# Patient Record
Sex: MALE | Employment: OTHER | URBAN - METROPOLITAN AREA
[De-identification: names, ages, dates, MRNs, and addresses within clinical notes are randomized per-mention and may not be internally consistent; named-entity substitution may affect disease eponyms.]

---

## 2017-02-14 DIAGNOSIS — E03.9 HYPOTHYROIDISM, UNSPECIFIED TYPE: ICD-10-CM

## 2017-02-14 NOTE — TELEPHONE ENCOUNTER
Patient calling to say that his insurance has changed and he now has [facility] as his mail order pharmacy. Says he need a refill for his levothyroxine 175 mcg tablet 90 day supply. Says their # is [phone] and his Id # is [id number]. Says if you have any questions to call him [phone].

## 2017-02-15 RX ORDER — LEVOTHYROXINE SODIUM 175 UG/1
TABLET ORAL
Qty: 90 TAB | Refills: 0 | Status: SHIPPED | OUTPATIENT
Start: 2017-02-15 | End: 2017-05-05 | Stop reason: SDUPTHER

## 2017-02-16 DIAGNOSIS — N52.9 ERECTILE DYSFUNCTION, UNSPECIFIED ERECTILE DYSFUNCTION TYPE: ICD-10-CM

## 2017-02-16 NOTE — TELEPHONE ENCOUNTER
From: Shyam Spann Sr. To: Dominga Rodriguez MD  Sent: 2/16/2017 9:29 AM EST  Subject: Medication Renewal Request    Original authorizing provider: MD Nakul Chairez. would like a refill of the following medications:  sildenafil (REVATIO) 20 mg tablet Dominga Rodriguez MD]    Preferred pharmacy: [address] [place] [address]    Miya Malik, I'm sorry I should have added yesterday. This one should go to [facility].  Thanks, [employer]

## 2017-02-20 RX ORDER — SILDENAFIL CITRATE 20 MG/1
20-100 TABLET ORAL
Qty: 30 TAB | Refills: 0 | Status: SHIPPED | OUTPATIENT
Start: 2017-02-20

## 2017-05-05 DIAGNOSIS — E03.9 HYPOTHYROIDISM, UNSPECIFIED TYPE: ICD-10-CM

## 2017-05-08 RX ORDER — LEVOTHYROXINE SODIUM 175 UG/1
TABLET ORAL
Qty: 90 TAB | Refills: 0 | Status: SHIPPED | OUTPATIENT
Start: 2017-05-08 | End: 2017-06-16 | Stop reason: SDUPTHER

## 2017-05-17 ENCOUNTER — OFFICE VISIT (OUTPATIENT)
Dept: FAMILY MEDICINE CLINIC | Age: 62
End: 2017-05-17

## 2017-05-17 VITALS
OXYGEN SATURATION: 97 % | BODY MASS INDEX: 25.03 KG/M2 | RESPIRATION RATE: 16 BRPM | WEIGHT: 174.8 LBS | TEMPERATURE: 95.8 F | DIASTOLIC BLOOD PRESSURE: 88 MMHG | SYSTOLIC BLOOD PRESSURE: 128 MMHG | HEIGHT: 70 IN | HEART RATE: 52 BPM

## 2017-05-17 DIAGNOSIS — E03.9 HYPOTHYROIDISM, UNSPECIFIED TYPE: ICD-10-CM

## 2017-05-17 DIAGNOSIS — Z00.00 LABORATORY EXAM ORDERED AS PART OF ROUTINE GENERAL MEDICAL EXAMINATION: ICD-10-CM

## 2017-05-17 DIAGNOSIS — Z00.00 PHYSICAL EXAM: Primary | ICD-10-CM

## 2017-05-17 DIAGNOSIS — R73.09 INCREASED GLUCOSE LEVEL: ICD-10-CM

## 2017-05-17 DIAGNOSIS — N52.9 ERECTILE DYSFUNCTION, UNSPECIFIED ERECTILE DYSFUNCTION TYPE: ICD-10-CM

## 2017-05-17 DIAGNOSIS — E78.00 PURE HYPERCHOLESTEROLEMIA: ICD-10-CM

## 2017-05-17 DIAGNOSIS — K21.9 GASTROESOPHAGEAL REFLUX DISEASE, ESOPHAGITIS PRESENCE NOT SPECIFIED: ICD-10-CM

## 2017-05-17 DIAGNOSIS — I25.10 PLAQUE IN HEART ARTERY: ICD-10-CM

## 2017-05-17 RX ORDER — RANITIDINE HCL 75 MG
75 TABLET ORAL AS NEEDED
COMMUNITY

## 2017-05-17 NOTE — LETTER
5/19/2017 2:31 PM 
 
Mr. Emily [address] [place] [address] Dear Maye Ann.: 
 
Please find your most recent results below. Resulted Orders VITAMIN D, 25 HYDROXY Result Value Ref Range VITAMIN D, 25-HYDROXY 44.9 30.0 - 100.0 ng/mL Comment:  
   Vitamin D deficiency has been defined by the [address] [place] [address] practice guideline as a 
level of serum 25-OH vitamin D less than 20 ng/mL (1,2). The Endocrine Society went on to further define vitamin D 
insufficiency as a level between 21 and 29 ng/mL (2). 1. IOM ([place] of Medicine). 2010. Dietary reference 
   intakes for calcium and D. [facility]: The 
   [employer]. 2. Hakeem MF, Homa SOMERS, Wanda COLLINS, et al. 
   Evaluation, treatment, and prevention of vitamin D 
   deficiency: an Endocrine Society clinical practice 
   guideline. JCEM. 2011 Jul; 96(7):1911-30. Narrative Performed at:  [address]  [id number] : Dave Vergara MD, Phone:  [phone] URINALYSIS W/ RFLX MICROSCOPIC Result Value Ref Range Specific Gravity 1.014 1.005 - 1.030  
 pH (UA) 6.5 5.0 - 7.5 Color Yellow Yellow Appearance Clear Clear Leukocyte Esterase Negative Negative Protein Negative Negative/Trace Glucose Negative Negative Ketone Negative Negative Blood Negative Negative Bilirubin Negative Negative Urobilinogen 0.2 0.2 - 1.0 mg/dL Nitrites Negative Negative Microscopic Examination Comment Comment:  
   Microscopic not indicated and not performed. Narrative Performed at:  [address]  [id number] : Dave Vergara MD, Phone:  [phone] TSH 3RD GENERATION Result Value Ref Range TSH 4.880 (H) 0.450 - 4.500 uIU/mL Narrative Performed at:  [place] [address] [address]  [id number] : Tammy Joseph MD, Phone:  [phone] PROSTATE SPECIFIC AG (PSA) Result Value Ref Range Prostate Specific Ag 0.5 0.0 - 4.0 ng/mL Comment:  
   Roche ECLIA methodology. According to the American Urological Association, Serum PSA should 
decrease and remain at undetectable levels after radical 
prostatectomy. The AUA defines biochemical recurrence as an initial 
PSA value 0.2 ng/mL or greater followed by a subsequent confirmatory PSA value 0.2 ng/mL or greater. Values obtained with different assay methods or kits cannot be used 
interchangeably. Results cannot be interpreted as absolute evidence 
of the presence or absence of malignant disease. Narrative Performed at:  [address]  [id number] : Tammy Joseph MD, Phone:  [phone] METABOLIC PANEL, COMPREHENSIVE Result Value Ref Range Glucose 105 (H) 65 - 99 mg/dL BUN 16 8 - 27 mg/dL Creatinine 0.98 0.76 - 1.27 mg/dL GFR est non-AA 82 >59 mL/min/1.73 GFR est AA 94 >59 mL/min/1.73  
 BUN/Creatinine ratio 16 10 - 24 Sodium 140 134 - 144 mmol/L Potassium 5.2 3.5 - 5.2 mmol/L Chloride 100 96 - 106 mmol/L  
 CO2 27 18 - 29 mmol/L Calcium 9.6 8.6 - 10.2 mg/dL Protein, total 7.0 6.0 - 8.5 g/dL Albumin 4.8 3.6 - 4.8 g/dL GLOBULIN, TOTAL 2.2 1.5 - 4.5 g/dL A-G Ratio 2.2 1.2 - 2.2 Bilirubin, total 0.8 0.0 - 1.2 mg/dL Alk. phosphatase 58 39 - 117 IU/L  
 AST (SGOT) 38 0 - 40 IU/L  
 ALT (SGPT) 35 0 - 44 IU/L Narrative Performed at:  [address]  [id number] : Tammy Joseph MD, Phone:  [phone] LIPID PANEL Result Value Ref Range Cholesterol, total 220 (H) 100 - 199 mg/dL Triglyceride 74 0 - 149 mg/dL HDL Cholesterol 74 >39 mg/dL VLDL, calculated 15 5 - 40 mg/dL LDL, calculated 131 (H) 0 - 99 mg/dL Narrative Performed at:  [address] [address]  [id number] : Tammy Joseph MD, Phone:  [phone] T4, FREE Result Value Ref Range T4, Free 1.61 0.82 - 1.77 ng/dL Narrative Performed at:  [address] [address]  [id number] : Tammy Joseph MD, Phone:  [phone] CVD REPORT Result Value Ref Range INTERPRETATION Note Comment:  
   Supplement report is available. Narrative Performed at:  [address] [place] [address]  [id number] : Aleisha Serrano PhD, Phone:  [phone] Lo Mayorga MD

## 2017-05-17 NOTE — PROGRESS NOTES
Chief Complaint   Patient presents with    Complete Physical    Labs     Fasting     1. Have you been to the ER, urgent care clinic since your last visit? Hospitalized since your last visit? No    2. Have you seen or consulted any other health care providers outside of the [address] [place] [address] since your last visit? Include any pap smears or colon screening. No   I have reviewed Health Maintenance with the patient and updated. I have reviewed Health Maintenance with the patient and updated at previous visit. The patient was counseled on the dangers of tobacco use, and Patient is a non smoker. Reviewed strategies to maximize success, including Continue not to smoke. Complete Physical Exam Male  Pre-Visit Questions:    1. Do you follow a low fat or low salt diet ? y  2. Do you follow an exercise program? y  3. Have you had your tetanus booster in the last 10 years? y  3. Have you ever had a Pneumonia vaccine? y  11. Do you smoke? n  6. Do you consider yourself overweight? n  7. Do you perform Testicular self exam?y  8. Is there a family history of CAD< age 48? n  5. Is there a family history of Cancer? n  10. Do you have any Cancer risks? n  11. Have you had a colonoscopy? y  15. Have you been to your eye doctor past year?  n   15. Have you been to your dentist in the last 6 months?  y  15.   Have you had your flu shot for this season?  y

## 2017-05-17 NOTE — PROGRESS NOTES
HISTORY OF PRESENT ILLNESS  Gianluca Laurent Sr. is a 59 y.o. male. HPI   Here for [facility]. Eye doctor 1-2 yrs. Dentist 2 x annually. Colonoscopy '14.  10 yr repeat. Gets nausea and LUQ pain with diarrhea 1-2 x weekly. On probiotics. Takes Ranitidine prn with benefit. Derm annually. 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  SYNTHROID 175 mcg tablet TAKE 1 TABLET DAILY BEFORE BREAKFAST 7 DAYS PER WEEK 90 Tab 0    sildenafil (REVATIO) 20 mg tablet Take 1-5 Tabs by mouth daily as needed.  Indications: Erectile Dysfunction 30 Tab 0     Allergies   Allergen Reactions    Lescol [Fluvastatin] Other (comments)     HAs, arthralgias    Codeine Other (comments)     syncope    Crestor [Rosuvastatin] Myalgia     Knee pain that resolved once off meds    Levitra [Vardenafil] Other (comments)     Face flushed and eyes bloodshot    Lipitor [Atorvastatin] Myalgia     Wide spread body aches    Pravastatin Myalgia     Shoulder and leg pains    Simvastatin Myalgia     Past Medical History:   Diagnosis Date    Abdominal pain 9/25/15    Apolinar Mayer Advance directive discussed with patient 05/09/2016    Chronic pain     back and right knee    Contact dermatitis and other eczema, due to unspecified cause     ak- derm yearly    Depression     ED (erectile dysfunction)     6/14/16 [facility]    Ganglion cyst of wrist 2/2009    dr John menjivar    GERD (gastroesophageal reflux disease) 11/17/2014    [facility]-to have EGD &colonscopy 10/25/16 f/u note    Headache     History of chicken pox     Hypercholesterolemia     Loose stools 10/25/2016    note from [facility]-stools studies to be done  OA (osteoarthritis) of knee     [facility] rash 9/2015    on his back    Thyroid disease      Past Surgical History:   Procedure Laterality Date    CT HEART W/O CONT WITH CALCIUM  1/2008    score 5    CT HEART W/O CONT WITH CALCIUM  11/2010    score 13.91    ENDOSCOPY, COLON, DIAGNOSTIC  10/04    dr Emi kay repeat 5-10    HX COLONOSCOPY  11/21/14    shyam 10 yr repeat    HX ENDOSCOPY  11/21/14    path neg for Barretts,shows inflammation    HX HEENT      T and A    HX ORTHOPAEDIC  2009    gaglion cyst on right wrist    HX UROLOGICAL      vasectomy     Family History   Problem Relation Age of Onset    Heart Disease Father     Psychiatric Disorder Brother      Social History   Substance Use Topics    Smoking status: Former Smoker    Smokeless tobacco: Never Used    Alcohol use Yes      Comment: Beer and Wine weekly      Lab Results  Component Value Date/Time   WBC 5.5 05/09/2016 09:33 AM   HGB 16.0 05/09/2016 09:33 AM   HCT 45.5 05/09/2016 09:33 AM   PLATELET 196 73/49/5592 09:33 AM   MCV 91 05/09/2016 09:33 AM       Lab Results  Component Value Date/Time   Hemoglobin A1c 5.8 10/28/2016 10:11 AM   Hemoglobin A1c 5.7 07/28/2016 03:10 PM   Hemoglobin A1c 5.7 05/09/2016 09:33 AM   Glucose 109 05/09/2016 09:33 AM   LDL, calculated 129 05/09/2016 09:33 AM   Creatinine 1.00 05/09/2016 09:33 AM      Lab Results  Component Value Date/Time   Cholesterol, total 205 05/09/2016 09:33 AM   HDL Cholesterol 58 05/09/2016 09:33 AM   LDL, calculated 129 05/09/2016 09:33 AM   Triglyceride 89 05/09/2016 09:33 AM       Lab Results  Component Value Date/Time   ALT (SGPT) 33 05/09/2016 09:33 AM   AST (SGOT) 31 05/09/2016 09:33 AM   Alk.  phosphatase 67 05/09/2016 09:33 AM   Bilirubin, direct 0.30 10/10/2011 09:00 AM   Bilirubin, total 0.9 05/09/2016 09:33 AM       Lab Results  Component Value Date/Time   GFR est AA 93 05/09/2016 09:33 AM   GFR est non-AA 80 05/09/2016 09:33 AM   Creatinine 1.00 05/09/2016 09:33 AM   BUN 13 05/09/2016 09:33 AM   Sodium 141 05/09/2016 09:33 AM   Potassium 4.9 05/09/2016 09:33 AM   Chloride 99 05/09/2016 09:33 AM   CO2 25 05/09/2016 09:33 AM      Lab Results  Component Value Date/Time   TSH 1.870 10/28/2016 10:11 AM   T4, Free 1.70 10/28/2016 10:11 AM      Lab Results   Component Value Date/Time    Glucose 109 05/09/2016 09:33 AM       Fasting for labs  Review of Systems   Constitutional: Positive for weight loss. HENT: Negative. Eyes: Negative. Respiratory: Negative. Cardiovascular: Negative. Gastrointestinal: Positive for abdominal pain and diarrhea. Genitourinary: Positive for frequency. Musculoskeletal: Negative. Skin: Negative. Neurological: Negative. Endo/Heme/Allergies: Positive for environmental allergies. Psychiatric/Behavioral: Negative. Physical Exam   Vitals:    05/17/17 0826   BP: 128/88   Pulse: (!) 52   Resp: 16   Temp: 95.8 °F (35.4 °C)   TempSrc: Oral   SpO2: 97%   Weight: 174 lb 12.8 oz (79.3 kg)   Height: 5' 9.5\" (1.765 m)       General  alert, cooperative, no distress, appears stated age   Head  Normocephalic, without obvious abnormality, atraumatic   Eyes  conjunctivae/corneas clear. PERRL. Fundi benign   Ears  Canals and TMs clear   Nose Passages patent. Mucosa normal. No drainage or sinus tenderness. Throat Lips, mucosa, and tongue normal. Teeth and gums normal.  Post pharynx neg. Neck supple, nontender, no adenopathy, thyroid: not enlarged, no masses/nodules, no carotid bruits   Back   symmetric, no curvature. FROM. No CVA tenderness   Lungs   clear to auscultation bilaterally   Chest wall  no tenderness   Heart  regular rate and rhythm, no murmur, click, rub or gallop   Abdomen   soft, non-tender. Bowel sounds normal. No masses,  No organomegaly   Genitalia  Testes descended bilat w/o masses.   No hernias   Rectal  Normal tone, normal prostate, no masses or tenderness   Extremities extremities normal, atraumatic, no cyanosis or edema   Pulses 2+ and symmetric except absent d.p.'s   Skin No rashes or lesions       Neurologic Romberg neg, nml heel, toe and Tandem gait. DTRs 2+ symmetric         ASSESSMENT and PLAN    ICD-10-CM ICD-9-CM    1. Physical exam Z00.00 V70.9 VITAMIN D, 25 HYDROXY      URINALYSIS W/ RFLX MICROSCOPIC      TSH 3RD GENERATION      PROSTATE SPECIFIC AG (PSA)      METABOLIC PANEL, COMPREHENSIVE      LIPID PANEL   2. Erectile dysfunction, unspecified erectile dysfunction type N52.9 607.84    3. 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 Hypothyroidism, unspecified type E03.9 244.9 TSH 3RD GENERATION      T4, FREE   5. Pure hypercholesterolemia E78.00 272.0 LIPID PANEL   6. Gastroesophageal reflux disease, esophagitis presence not specified K21.9 530.81 raNITIdine (ZANTAC 75) 75 mg tablet   7. Laboratory exam ordered as part of routine general medical examination Z00.00 V72.62 VITAMIN D, 25 HYDROXY      URINALYSIS W/ RFLX MICROSCOPIC      TSH 3RD GENERATION      PROSTATE SPECIFIC AG (PSA)      METABOLIC PANEL, COMPREHENSIVE      LIPID PANEL     Follow-up Disposition:  Return in about 1 year (around 5/17/2018) for [facility], labs.

## 2017-05-17 NOTE — MR AVS SNAPSHOT
Visit Information Date & Time Provider Department Dept. Phone Encounter #  
 5/17/2017  8:15 AM David Holbrook MD [facility] [phone] [id number] Upcoming Health Maintenance Date Due INFLUENZA AGE 9 TO ADULT 8/1/2017 DTaP/Tdap/Td series (2 - Td) 2/8/2023 COLONOSCOPY 11/21/2024 Allergies as of 5/17/2017  Review Complete On: 5/17/2017 By: David Holbrook MD  
  
 Severity Noted Reaction Type Reactions Lescol [Fluvastatin] Medium 09/08/2016   Side Effect Other (comments) HAs, arthralgias Codeine  01/27/2010    Other (comments)  
 syncope Crestor [Rosuvastatin]  09/29/2014    Myalgia Knee pain that resolved once off meds Levitra [Vardenafil]  01/27/2010    Other (comments) Face flushed and eyes bloodshot Lipitor [Atorvastatin]  08/22/2016    Myalgia Wide spread body aches Pravastatin  11/22/2010    Myalgia Shoulder and leg pains Simvastatin  07/27/2016    Myalgia Current Immunizations  Reviewed on 5/1/2013 Name Date Influenza Vaccine Split 10/10/2011 Pneumococcal Vaccine (Unspecified Type) 9/7/2004 TD Vaccine 3/21/2003 Tdap 2/8/2013 Zoster Vaccine, Live 5/1/2013 Not reviewed this visit You Were Diagnosed With   
  
 Codes Comments Physical exam    -  Primary ICD-10-CM: Z00.00 ICD-9-CM: V70.9 Erectile dysfunction, unspecified erectile dysfunction type     ICD-10-CM: N52.9 ICD-9-CM: 607.84 Plaque in heart artery     ICD-10-CM: I25.10 ICD-9-CM: 414.00 Hypothyroidism, unspecified type     ICD-10-CM: E03.9 ICD-9-CM: 244.9 Pure hypercholesterolemia     ICD-10-CM: E78.00 ICD-9-CM: 272.0 Gastroesophageal reflux disease, esophagitis presence not specified     ICD-10-CM: K21.9 ICD-9-CM: 530.81 Laboratory exam ordered as part of routine general medical examination     ICD-10-CM: Z00.00 ICD-9-CM: V72.62 Vitals BP Pulse Temp Resp Height(growth percentile) Weight(growth percentile) 128/88 (BP 1 Location: Left arm, BP Patient Position: Sitting) (!) 52 95.8 °F (35.4 °C) (Oral) 16 5' 9.5\" (1.765 m) 174 lb 12.8 oz (79.3 kg) SpO2 BMI Smoking Status 97% 25.44 kg/m2 Former Smoker Vitals History BMI and BSA Data Body Mass Index Body Surface Area  
 25.44 kg/m 2 1.97 m 2 Preferred Pharmacy Pharmacy Name Phone  [facility] [address] [address] [phone] Your Updated Medication List  
  
   
This list is accurate as of: 5/17/17  9:20 AM.  Always use your most recent med list.  
  
  
  
  
 sildenafil 20 mg tablet Commonly known as:  REVATIO Take 1-5 Tabs by mouth daily as needed. Indications: Erectile Dysfunction SYNTHROID 175 mcg tablet Generic drug:  levothyroxine TAKE 1 TABLET DAILY BEFORE BREAKFAST 7 DAYS PER WEEK ZANTAC 75 75 mg tablet Generic drug:  raNITIdine Take 75 mg by mouth as needed for Indigestion. We Performed the Following LIPID PANEL [47657 CPT(R)] METABOLIC PANEL, COMPREHENSIVE [40478 CPT(R)] PROSTATE SPECIFIC AG (PSA) [id number] CPT(R)] T4, FREE [id number] CPT(R)] TSH 3RD GENERATION [55349 CPT(R)] URINALYSIS W/ RFLX MICROSCOPIC [09817 CPT(R)] VITAMIN D, 25 HYDROXY [id number] CPT(R)] Introducing [facility] SERVICES! Dear Harika Wagoner: Thank you for requesting a [facility] account. Our records indicate that you already have an active [facility] account. You can access your account anytime at [email or web address] Did you know that you can access your hospital and ER discharge instructions at any time in [facility]? You can also review all of your test results from your hospital stay or ER visit. Additional Information If you have questions, please visit the Frequently Asked Questions section of the [facility] website at [email or web address] Remember, [employer]hart is NOT to be used for urgent needs. For medical emergencies, dial 911. Now available from your iPhone and Android! Please provide this summary of care documentation to your next provider. Your primary care clinician is listed as [address] If you have any questions after today's visit, please call [phone].

## 2017-05-17 NOTE — LETTER
5/19/2017 2:55 PM 
 
Mr. [address] [id number] Dear Clara Stevenson.: 
 
Please find your most recent results below. Resulted Orders VITAMIN D, 25 HYDROXY Result Value Ref Range VITAMIN D, 25-HYDROXY 44.9 30.0 - 100.0 ng/mL Comment:  
   Vitamin D deficiency has been defined by the [facility] practice guideline as a 
level of serum 25-OH vitamin D less than 20 ng/mL (1,2). The Endocrine Society went on to further define vitamin D 
insufficiency as a level between 21 and 29 ng/mL (2). 1. IOM ([place] of Medicine). 2010. Dietary reference 
   intakes for calcium and D. [facility]: The 
   [employer]. 2. Hakeem MF, Homa SOMERS, Wanda COLLINS, et al. 
   Evaluation, treatment, and prevention of vitamin D 
   deficiency: an Endocrine Society clinical practice 
   guideline. JCEM. 2011 Jul; 96(7):1911-30. Narrative Performed at:  [address]  [id number] : Santi Truong MD, Phone:  [phone] URINALYSIS W/ RFLX MICROSCOPIC Result Value Ref Range Specific Gravity 1.014 1.005 - 1.030  
 pH (UA) 6.5 5.0 - 7.5 Color Yellow Yellow Appearance Clear Clear Leukocyte Esterase Negative Negative Protein Negative Negative/Trace Glucose Negative Negative Ketone Negative Negative Blood Negative Negative Bilirubin Negative Negative Urobilinogen 0.2 0.2 - 1.0 mg/dL Nitrites Negative Negative Microscopic Examination Comment Comment:  
   Microscopic not indicated and not performed. Narrative Performed at:  [address]  [id number] : Santi Truong MD, Phone:  [phone] TSH 3RD GENERATION Result Value Ref Range TSH 4.880 (H) 0.450 - 4.500 uIU/mL Narrative Performed at:  [place] [address] [address]  [id number] : Jolie Bruce MD, Phone:  [phone] PROSTATE SPECIFIC AG (PSA) Result Value Ref Range Prostate Specific Ag 0.5 0.0 - 4.0 ng/mL Comment:  
   Roche ECLIA methodology. According to the American Urological Association, Serum PSA should 
decrease and remain at undetectable levels after radical 
prostatectomy. The AUA defines biochemical recurrence as an initial 
PSA value 0.2 ng/mL or greater followed by a subsequent confirmatory PSA value 0.2 ng/mL or greater. Values obtained with different assay methods or kits cannot be used 
interchangeably. Results cannot be interpreted as absolute evidence 
of the presence or absence of malignant disease. Narrative Performed at:  [address]  [id number] : Jolie Bruce MD, Phone:  [phone] METABOLIC PANEL, COMPREHENSIVE Result Value Ref Range Glucose 105 (H) 65 - 99 mg/dL BUN 16 8 - 27 mg/dL Creatinine 0.98 0.76 - 1.27 mg/dL GFR est non-AA 82 >59 mL/min/1.73 GFR est AA 94 >59 mL/min/1.73  
 BUN/Creatinine ratio 16 10 - 24 Sodium 140 134 - 144 mmol/L Potassium 5.2 3.5 - 5.2 mmol/L Chloride 100 96 - 106 mmol/L  
 CO2 27 18 - 29 mmol/L Calcium 9.6 8.6 - 10.2 mg/dL Protein, total 7.0 6.0 - 8.5 g/dL Albumin 4.8 3.6 - 4.8 g/dL GLOBULIN, TOTAL 2.2 1.5 - 4.5 g/dL A-G Ratio 2.2 1.2 - 2.2 Bilirubin, total 0.8 0.0 - 1.2 mg/dL Alk. phosphatase 58 39 - 117 IU/L  
 AST (SGOT) 38 0 - 40 IU/L  
 ALT (SGPT) 35 0 - 44 IU/L Narrative Performed at:  [address]  [id number] : Jolie Bruce MD, Phone:  [phone] LIPID PANEL Result Value Ref Range Cholesterol, total 220 (H) 100 - 199 mg/dL Triglyceride 74 0 - 149 mg/dL HDL Cholesterol 74 >39 mg/dL VLDL, calculated 15 5 - 40 mg/dL LDL, calculated 131 (H) 0 - 99 mg/dL Narrative Performed at:  [address]  [id number] : Roberto Wong MD, Phone:  [phone] T4, FREE Result Value Ref Range T4, Free 1.61 0.82 - 1.77 ng/dL Narrative Performed at:  [address]  [id number] : Roberto Wong MD, Phone:  [phone] CVD REPORT Result Value Ref Range INTERPRETATION Note Comment:  
   Supplement report is available. Narrative Performed at:  [address] [place] [address] [address] [place] [address]  [id number] : Aldo Gutierrez PhD, Phone:  [phone] David Hui MD

## 2017-05-18 LAB
25(OH)D3+25(OH)D2 SERPL-MCNC: 44.9 NG/ML (ref 30–100)
ALBUMIN SERPL-MCNC: 4.8 G/DL (ref 3.6–4.8)
ALBUMIN/GLOB SERPL: 2.2 {RATIO} (ref 1.2–2.2)
ALP SERPL-CCNC: 58 IU/L (ref 39–117)
ALT SERPL-CCNC: 35 IU/L (ref 0–44)
APPEARANCE UR: CLEAR
AST SERPL-CCNC: 38 IU/L (ref 0–40)
BILIRUB SERPL-MCNC: 0.8 MG/DL (ref 0–1.2)
BILIRUB UR QL STRIP: NEGATIVE
BUN SERPL-MCNC: 16 MG/DL (ref 8–27)
BUN/CREAT SERPL: 16 (ref 10–24)
CALCIUM SERPL-MCNC: 9.6 MG/DL (ref 8.6–10.2)
CHLORIDE SERPL-SCNC: 100 MMOL/L (ref 96–106)
CHOLEST SERPL-MCNC: 220 MG/DL (ref 100–199)
CO2 SERPL-SCNC: 27 MMOL/L (ref 18–29)
COLOR UR: YELLOW
CREAT SERPL-MCNC: 0.98 MG/DL (ref 0.76–1.27)
GLOBULIN SER CALC-MCNC: 2.2 G/DL (ref 1.5–4.5)
GLUCOSE SERPL-MCNC: 105 MG/DL (ref 65–99)
GLUCOSE UR QL: NEGATIVE
HDLC SERPL-MCNC: 74 MG/DL
HGB UR QL STRIP: NEGATIVE
INTERPRETATION, 910389: NORMAL
KETONES UR QL STRIP: NEGATIVE
LDLC SERPL CALC-MCNC: 131 MG/DL (ref 0–99)
LEUKOCYTE ESTERASE UR QL STRIP: NEGATIVE
MICRO URNS: NORMAL
NITRITE UR QL STRIP: NEGATIVE
PH UR STRIP: 6.5 [PH] (ref 5–7.5)
POTASSIUM SERPL-SCNC: 5.2 MMOL/L (ref 3.5–5.2)
PROT SERPL-MCNC: 7 G/DL (ref 6–8.5)
PROT UR QL STRIP: NEGATIVE
PSA SERPL-MCNC: 0.5 NG/ML (ref 0–4)
SODIUM SERPL-SCNC: 140 MMOL/L (ref 134–144)
SP GR UR: 1.01 (ref 1–1.03)
T4 FREE SERPL-MCNC: 1.61 NG/DL (ref 0.82–1.77)
TRIGL SERPL-MCNC: 74 MG/DL (ref 0–149)
TSH SERPL DL<=0.005 MIU/L-ACNC: 4.88 UIU/ML (ref 0.45–4.5)
UROBILINOGEN UR STRIP-MCNC: 0.2 MG/DL (ref 0.2–1)
VLDLC SERPL CALC-MCNC: 15 MG/DL (ref 5–40)

## 2017-05-18 NOTE — PROGRESS NOTES
Borderline low thyroid. If no missed doses, inc to next increment and recheck 4-6 weeks. Inc BS. Add A1c. Lipids little worse but recent heart scan with min plaque and statin intolerant. Take Krill oil supp. High HDL protective.

## 2017-05-19 DIAGNOSIS — E03.9 HYPOTHYROIDISM, UNSPECIFIED TYPE: Primary | ICD-10-CM

## 2017-05-19 DIAGNOSIS — R73.9 BLOOD GLUCOSE ELEVATED: ICD-10-CM

## 2017-05-19 NOTE — PROGRESS NOTES
Spoke to ID verified patient and he did not miss any doses but he got a new generic with last refill. He will take extra tablet  (8) per week and will recheck lab in 4 weeks. A1c could not be added with this lab since the correct tube for the test wasn't needed.  A1c added to his 1 month recheck labs

## 2017-06-15 LAB
EST. AVERAGE GLUCOSE BLD GHB EST-MCNC: 108 MG/DL
HBA1C MFR BLD: 5.4 % (ref 4.8–5.6)
T4 FREE SERPL-MCNC: 1.76 NG/DL (ref 0.82–1.77)
TSH SERPL DL<=0.005 MIU/L-ACNC: 3.54 UIU/ML (ref 0.45–4.5)

## 2018-05-08 DIAGNOSIS — E03.9 HYPOTHYROIDISM, UNSPECIFIED TYPE: ICD-10-CM

## 2018-05-08 NOTE — TELEPHONE ENCOUNTER
----- Message from Jenni Ibarra sent at 5/8/2018  3:37 PM EDT -----  Regarding: Dr. Melquiades Gunn  Patient is requesting a 90 day supply of the medication Levothyroxine sent to the [facility] on file. The patient's number is [phone].

## 2018-05-09 NOTE — TELEPHONE ENCOUNTER
PCP: Rakesh Gardner MD    Last appt: 6/14/2017  No future appointments.     Requested Prescriptions     Pending Prescriptions Disp Refills    levothyroxine (SYNTHROID) 175 mcg tablet 100 Tab 2     Lab Results   Component Value Date/Time    Sodium 140 05/17/2017 09:29 AM    Potassium 5.2 05/17/2017 09:29 AM    Chloride 100 05/17/2017 09:29 AM    CO2 27 05/17/2017 09:29 AM    Glucose 105 (H) 05/17/2017 09:29 AM    BUN 16 05/17/2017 09:29 AM    Creatinine 0.98 05/17/2017 09:29 AM    BUN/Creatinine ratio 16 05/17/2017 09:29 AM    GFR est AA 94 05/17/2017 09:29 AM    GFR est non-AA 82 05/17/2017 09:29 AM    Calcium 9.6 05/17/2017 09:29 AM     Lab Results   Component Value Date/Time    Hemoglobin A1c 5.4 06/14/2017 08:19 AM     Lab Results   Component Value Date/Time    Cholesterol, total 220 (H) 05/17/2017 09:29 AM    HDL Cholesterol 74 05/17/2017 09:29 AM    LDL, calculated 131 (H) 05/17/2017 09:29 AM    VLDL, calculated 15 05/17/2017 09:29 AM    Triglyceride 74 05/17/2017 09:29 AM     Lab Results   Component Value Date/Time    TSH 3.540 06/14/2017 08:19 AM

## 2018-05-15 DIAGNOSIS — E03.9 HYPOTHYROIDISM, UNSPECIFIED TYPE: ICD-10-CM

## 2018-05-16 NOTE — TELEPHONE ENCOUNTER
PCP: Girish Faust MD    Last appt: 6/14/2017  No future appointments. Requested Prescriptions     Pending Prescriptions Disp Refills    levothyroxine (SYNTHROID) 175 mcg tablet 34 Tab 0     Sig: TAKE 1 TABLET BY MOUTH DAILY BEFORE BREAKFAST.  TAKE 1 EXTRA TABLET ONE DAY PER WEEK (CHOOSE THE SAME DAY EACH WEEK)     Lab Results   Component Value Date/Time    Sodium 140 05/17/2017 09:29 AM    Potassium 5.2 05/17/2017 09:29 AM    Chloride 100 05/17/2017 09:29 AM    CO2 27 05/17/2017 09:29 AM    Glucose 105 (H) 05/17/2017 09:29 AM    BUN 16 05/17/2017 09:29 AM    Creatinine 0.98 05/17/2017 09:29 AM    BUN/Creatinine ratio 16 05/17/2017 09:29 AM    GFR est AA 94 05/17/2017 09:29 AM    GFR est non-AA 82 05/17/2017 09:29 AM    Calcium 9.6 05/17/2017 09:29 AM     Lab Results   Component Value Date/Time    Hemoglobin A1c 5.4 06/14/2017 08:19 AM     Lab Results   Component Value Date/Time    Cholesterol, total 220 (H) 05/17/2017 09:29 AM    HDL Cholesterol 74 05/17/2017 09:29 AM    LDL, calculated 131 (H) 05/17/2017 09:29 AM    VLDL, calculated 15 05/17/2017 09:29 AM    Triglyceride 74 05/17/2017 09:29 AM     Lab Results   Component Value Date/Time    TSH 3.540 06/14/2017 08:19 AM

## 2018-05-16 NOTE — TELEPHONE ENCOUNTER
From: Cecilia Gross Sr. To: Raz Pina MD  Sent: 5/15/2018 4:45 PM EDT  Subject: Medication Renewal Request    Original authorizing provider: MD Li Taylor. would like a refill of the following medications:  levothyroxine (SYNTHROID) 175 mcg tablet Raz Pina MD]    Preferred pharmacy: [facility]    Comment:

## 2018-05-17 RX ORDER — LEVOTHYROXINE SODIUM 175 UG/1
TABLET ORAL
Qty: 100 TAB | Refills: 0 | Status: SHIPPED | OUTPATIENT
Start: 2018-05-17

## 2018-05-18 RX ORDER — LEVOTHYROXINE SODIUM 175 UG/1
TABLET ORAL
Qty: 34 TAB | Refills: 0 | Status: SHIPPED | OUTPATIENT
Start: 2018-05-18

## 2021-12-20 ENCOUNTER — OFFICE VISIT (OUTPATIENT)
Dept: ORTHOPEDIC SURGERY | Age: 66
End: 2021-12-20
Payer: MEDICARE

## 2021-12-20 VITALS — HEIGHT: 70 IN | BODY MASS INDEX: 24.91 KG/M2 | WEIGHT: 174 LBS

## 2021-12-20 DIAGNOSIS — M17.11 PRIMARY OSTEOARTHRITIS OF RIGHT KNEE: Primary | ICD-10-CM

## 2021-12-20 PROCEDURE — 1101F PT FALLS ASSESS-DOCD LE1/YR: CPT | Performed by: ORTHOPAEDIC SURGERY

## 2021-12-20 PROCEDURE — 99214 OFFICE O/P EST MOD 30 MIN: CPT | Performed by: ORTHOPAEDIC SURGERY

## 2021-12-20 PROCEDURE — G8536 NO DOC ELDER MAL SCRN: HCPCS | Performed by: ORTHOPAEDIC SURGERY

## 2021-12-20 PROCEDURE — 3017F COLORECTAL CA SCREEN DOC REV: CPT | Performed by: ORTHOPAEDIC SURGERY

## 2021-12-20 PROCEDURE — G8432 DEP SCR NOT DOC, RNG: HCPCS | Performed by: ORTHOPAEDIC SURGERY

## 2021-12-20 PROCEDURE — G8427 DOCREV CUR MEDS BY ELIG CLIN: HCPCS | Performed by: ORTHOPAEDIC SURGERY

## 2021-12-20 PROCEDURE — G8420 CALC BMI NORM PARAMETERS: HCPCS | Performed by: ORTHOPAEDIC SURGERY

## 2021-12-20 RX ORDER — DICLOFENAC SODIUM 75 MG/1
75 TABLET, DELAYED RELEASE ORAL 2 TIMES DAILY
Qty: 60 TABLET | Refills: 3 | Status: SHIPPED | OUTPATIENT
Start: 2021-12-20

## 2021-12-20 NOTE — PROGRESS NOTES
Nathaly Guzman Sr. (: 1955) is a 74 y.o. male, established patient, here for evaluation of the following chief complaint(s):  Knee Pain (right)       ASSESSMENT/PLAN:  Below is the assessment and plan developed based on review of pertinent history, physical exam, labs, studies, and medications. We discussed trying a diclofenac prescription for times when he has acute flareups of his medial sided knee pain. However, we also discussed possibility of following up with one of our joint specialist in the future for further evaluation and treatment planning. Likely total knee arthroplasty. 1. Primary osteoarthritis of right knee  -     XR KNEE RT MIN 4 V; Future      Return if symptoms worsen or fail to improve. SUBJECTIVE/OBJECTIVE:  Nathaly Guzman Sr. (: 1955) is a 74 y.o. male. He notes that he has been battling achy pain in the knee for years. He has had a couple of arthroscopy procedures and tolerates arthritis pain. He has received corticosteroid injections with minimal relief in the past.  He has noted 2 instances of sharp medial sided pain recently. Allergies   Allergen Reactions    Lescol [Fluvastatin] Other (comments)     HAs, arthralgias    Codeine Other (comments)     syncope    Crestor [Rosuvastatin] Myalgia     Knee pain that resolved once off meds    Levitra [Vardenafil] Other (comments)     Face flushed and eyes bloodshot    Lipitor [Atorvastatin] Myalgia     Wide spread body aches    Pravastatin Myalgia     Shoulder and leg pains    Simvastatin Myalgia       Current Outpatient Medications   Medication Sig    levothyroxine (SYNTHROID) 175 mcg tablet TAKE 1 TABLET BY MOUTH DAILY BEFORE BREAKFAST. TAKE 1 EXTRA TABLET ONE DAY PER WEEK (CHOOSE THE SAME DAY EACH WEEK)    levothyroxine (SYNTHROID) 175 mcg tablet TAKE 1 TABLET BY MOUTH DAILY BEFORE BREAKFAST.  TAKE 1 EXTRA TABLET ONE DAY PER WEEK (CHOOSE THE SAME DAY EACH WEEK AND TAKE 2 TABLETS THAT D Indications: hypothyroidism    sildenafil (REVATIO) 20 mg tablet Take 1-5 Tabs by mouth daily as needed. Indications: Erectile Dysfunction    raNITIdine (ZANTAC 75) 75 mg tablet Take 75 mg by mouth as needed for Indigestion. (Patient not taking: Reported on 12/20/2021)     No current facility-administered medications for this visit. Social History     Socioeconomic History    Marital status:      Spouse name: Not on file    Number of children: Not on file    Years of education: Not on file    Highest education level: Not on file   Occupational History    Not on file   Tobacco Use    Smoking status: Former Smoker    Smokeless tobacco: Never Used   Substance and Sexual Activity    Alcohol use: Yes     Comment: Beer and Wine weekly    Drug use: No    Sexual activity: Yes     Partners: Female     Birth control/protection: Surgical   Other Topics Concern    Not on file   Social History Narrative    Not on file     Social Determinants of Health     Financial Resource Strain:     Difficulty of Paying Living Expenses: Not on file   Food Insecurity:     Worried About Running Out of Food in the Last Year: Not on file    Renee of Food in the Last Year: Not on file   Transportation Needs:     Lack of Transportation (Medical): Not on file    Lack of Transportation (Non-Medical):  Not on file   Physical Activity:     Days of Exercise per Week: Not on file    Minutes of Exercise per Session: Not on file   Stress:     Feeling of Stress : Not on file   Social Connections:     Frequency of Communication with Friends and Family: Not on file    Frequency of Social Gatherings with Friends and Family: Not on file    Attends Yazdanism Services: Not on file    Active Member of Clubs or Organizations: Not on file    Attends Club or Organization Meetings: Not on file    Marital Status: Not on file   Intimate Partner Violence:     Fear of Current or Ex-Partner: Not on file    Emotionally Abused: Not on file    Physically Abused: Not on file    Sexually Abused: Not on file   Housing Stability:     Unable to Pay for Housing in the Last Year: Not on file    Number of Places Lived in the Last Year: Not on file    Unstable Housing in the Last Year: Not on file       Past Surgical History:   Procedure Laterality Date    CT HEART W/O CONT WITH CALCIUM  1/2008    score 5    CT HEART W/O CONT WITH CALCIUM  11/2010    score 13.91    ENDOSCOPY, COLON, DIAGNOSTIC  10/04    dr Marium Anderson repeat 5-10    HX COLONOSCOPY  11/21/14    [facility] 10 yr repeat    HX ENDOSCOPY  11/21/14    path neg for Barretts,shows inflammation    HX HEENT      T and A    HX ORTHOPAEDIC  2009    gaglion cyst on right wrist    HX UROLOGICAL      vasectomy       Family History   Problem Relation Age of Onset    Heart Disease Father     Psychiatric Disorder Brother                REVIEW OF SYSTEMS:  ROS     Positive for: Musculoskeletal    Last edited by Joselito Babin on 12/20/2021 11:02 AM. (History)        Patient denies any recent fever, chills, nausea, vomiting, chest pain, or shortness of breath. Vitals:  Ht 5' 10\" (1.778 m)   Wt 174 lb (78.9 kg)   BMI 24.97 kg/m²    Body mass index is 24.97 kg/m². PHYSICAL EXAM:  General exam: Patient is awake, alert, and oriented x3. Well-appearing. No acute distress. Ambulates with a normal gait. Right knee: Neurovascular and sensory intact. Effusion is present. Crepitus is noted with range of motion. There is tenderness palpation at the medial and lateral joint line. Jeet's maneuver creates mild pain. Normal stability on ligamentous testing including Lachman's exam.  Stable anterior and posterior drawer. No erythema or ecchymosis.         IMAGING:    XR Results (most recent):  Results from Appointment encounter on 12/20/21    XR KNEE RT MIN 4 V    Narrative  X-rays of the right knee 4 views done today show evidence of advanced bone-on-bone joint space narrowing with osteophyte formation. Patellofemoral osteophyte formation noted as well      Results from Hospital Encounter encounter on 04/06/11    XR SPINE THORACIC 3 VIEW    Narrative  **Final Report**      ICD Codes / Adm. Diagnosis: 724.5   / CHRONIC BACK PAIN  Examination:  CR T SPINE 3 [facility]  - [id number] - Apr 6 2011 11:01AM  Accession No:  [id number]  Reason:  chronic back pain      REPORT:  INDICATION:  chronic back pain    EXAM: Three views thoracic spine. No comparisons    FINDINGS: There is a slight levoconvex rotoscoliosis of the thoracic spine. There is multilevel degenerative change of the lower thoracic spine. No  acute fracture. IMPRESSION:  1. Degenerative changes of lower thoracic spine        Interpreting/Reading Doctor: Bishop Diaz ([id number])  Transcribed:  on 04/06/2011  Approved: Roberta FLORES ([id number])  04/06/2011        Distribution:      XR SPINE LUMBAR 2 OR 3 VIEWS    Narrative  **Final Report**      ICD Codes / Adm. Diagnosis: 724.5   / CHRONIC BACK PAIN  Examination:  CR L SPINE MAX 3 [facility]  - [id number] - Apr 6 2011 11:01AM  Accession No:  [id number]  Reason:  chronic back pain      REPORT:  INDICATION:   chronic back pain    EXAMINATION:  LUMBAR SPINE 3  Views    COMPARISON: None    FINDINGS:    AP, lateral, and coned down lateral views of the lumbar spine demonstrate no  acute fracture or subluxation. There is minimal retrolisthesis at L5-S1,  where there is associated endplate sclerosis and osteophyte formation. There  are mild degenerative osteophytes in the lower thoracic and lumbar spine. The sacroiliac joints are intact. IMPRESSION:  Degenerative changes predominantly at L5-S1. No acute abnormality.         Interpreting/Reading Doctor: Kezia Tamez ([id number])  Transcribed:  on 04/06/2011  Approved: Kezia Tamez ([id number])  04/06/2011        Distribution:         Orders Placed This Encounter    XR KNEE RT MIN 4 V     Standing Status:   Future     Number of Occurrences:   1 Standing Expiration Date:   12/21/2022              An electronic signature was used to authenticate this note.   -- Marisela Kilgore, DO

## 2021-12-20 NOTE — LETTER
12/20/2021    Patient: Roberto Mclaughlin. YOB: 1955   Date of Visit: 12/20/2021     Gustavo Vargas MD  [address]  [address] [address]  Via Fax: [phone]    Dear Gustavo Vargas MD,      Thank you for referring Mr. Micheal Ho to [facility] for evaluation. My notes for this consultation are attached. If you have questions, please do not hesitate to call me. I look forward to following your patient along with you.       Sincerely,    Shane Ivey, DO

## 2022-03-03 DIAGNOSIS — M17.11 PRIMARY OSTEOARTHRITIS OF RIGHT KNEE: Primary | ICD-10-CM

## 2022-03-03 RX ORDER — DICLOFENAC SODIUM 75 MG/1
75 TABLET, DELAYED RELEASE ORAL 2 TIMES DAILY
Qty: 60 TABLET | Refills: 3 | Status: SHIPPED | OUTPATIENT
Start: 2022-03-03

## 2022-03-14 ENCOUNTER — OFFICE VISIT (OUTPATIENT)
Dept: ORTHOPEDIC SURGERY | Age: 67
End: 2022-03-14
Payer: MEDICARE

## 2022-03-14 VITALS — WEIGHT: 175 LBS | HEIGHT: 70 IN | BODY MASS INDEX: 25.05 KG/M2

## 2022-03-14 DIAGNOSIS — M17.11 PRIMARY OSTEOARTHRITIS OF RIGHT KNEE: Primary | ICD-10-CM

## 2022-03-14 PROCEDURE — G8536 NO DOC ELDER MAL SCRN: HCPCS | Performed by: ORTHOPAEDIC SURGERY

## 2022-03-14 PROCEDURE — 3017F COLORECTAL CA SCREEN DOC REV: CPT | Performed by: ORTHOPAEDIC SURGERY

## 2022-03-14 PROCEDURE — 99213 OFFICE O/P EST LOW 20 MIN: CPT | Performed by: ORTHOPAEDIC SURGERY

## 2022-03-14 PROCEDURE — G8419 CALC BMI OUT NRM PARAM NOF/U: HCPCS | Performed by: ORTHOPAEDIC SURGERY

## 2022-03-14 PROCEDURE — G8432 DEP SCR NOT DOC, RNG: HCPCS | Performed by: ORTHOPAEDIC SURGERY

## 2022-03-14 PROCEDURE — G8427 DOCREV CUR MEDS BY ELIG CLIN: HCPCS | Performed by: ORTHOPAEDIC SURGERY

## 2022-03-14 PROCEDURE — 1101F PT FALLS ASSESS-DOCD LE1/YR: CPT | Performed by: ORTHOPAEDIC SURGERY

## 2022-03-14 NOTE — LETTER
3/14/2022    Patient: Livier Ni. YOB: 1955   Date of Visit: 3/14/2022     Paige Bravo MD  [address]  [place] [address]  Via Fax: [phone]    Dear Paige Bravo MD,      Thank you for referring Mr. Zachariah Mello to [facility] for evaluation. My notes for this consultation are attached. If you have questions, please do not hesitate to call me. I look forward to following your patient along with you.       Sincerely,    Mark Ramos PA-C

## 2022-03-14 NOTE — PROGRESS NOTES
Mallorie Romero Sr. (: 1955) is a 74 y.o. male, patient, here for evaluation of the following chief complaint(s):  Knee Pain (right) and Neck Pain       SUBJECTIVE/OBJECTIVE:  Rehan Orlando. presents today complaining of right knee pain. To review, he used to see Dr. Huseyin Goins for years. He recently saw Dr. Ligia Ashton. Conservative treatment to date for the right knee includes arthroscopic knee surgeries, multiple cortisone injections, and anti-inflammatories. He recently started diclofenac which has helped a great deal.  He is quite functional with the right knee, able to golf. No awakening night pain. Knee does not limit his activities. 2 instances of severe knee pain that radiated up his thigh into his groin. Pain left him essentially incapacitated for short period of time. Pain was transient, leaving as quickly as it came on. He was lying in bed when it happened, denies injury or trauma. PHYSICAL EXAM:  Vitals: Ht 5' 10\" (1.778 m)   Wt 175 lb (79.4 kg)   BMI 25.11 kg/m²   Body mass index is 25.11 kg/m². 74y.o. year old M in no acute distress. Ambulates with a slight limp on the right. Negative Stinchfield bilaterally. Well-preserved passive hip range of motion without pain. Internal rotation is stiff, but no pain can be elicited. Negative straight leg raise. Knee skin warm, dry, no effusion. Healed scars and portals about the knee. Varus deformity. ROM 0-115. Motor 5/5. No distal edema. IMAGING:  Radiographs: Images ordered from previous visit and interpreted today. Complete loss of medial joint space. Moderate patellofemoral osteoarthritis. ASSESSMENT/PLAN:  1. Primary osteoarthritis of right knee    The xray and exam findings were discussed with the patient today. Severe right knee osteoarthritis. Symptoms and function do not match the severity of his x-rays.   Discussed risks/benefits of conservative vs. operative interventions at this time to include NSAIDs, PT, cortisone injections, and surgery. He is having good relief from diclofenac. If he needs a refill of this, he will let us know. He is taking sparingly at this point. He is otherwise active, and he was encouraged to continue low impact activity as tolerated. Decreasing efficacy of previous cortisone injections, and he does not wish to continue these today. Discussed natural disease progression and possible need for total knee arthroplasty in the future. Follow-up as needed   Return if symptoms worsen or fail to improve. Review Of Systems  ROS     Positive for: Musculoskeletal    Last edited by Bharati Blanchard RN on 3/14/2022  3:39 PM. (History)         Patient denies any recent fever, chills, nausea, vomiting, chest pain, or shortness of breath. Allergies   Allergen Reactions    Lescol [Fluvastatin] Other (comments)     HAs, arthralgias    Codeine Other (comments)     syncope    Crestor [Rosuvastatin] Myalgia     Knee pain that resolved once off meds    Levitra [Vardenafil] Other (comments)     Face flushed and eyes bloodshot    Lipitor [Atorvastatin] Myalgia     Wide spread body aches    Pravastatin Myalgia     Shoulder and leg pains    Simvastatin Myalgia       Current Outpatient Medications   Medication Sig    diclofenac EC (VOLTAREN) 75 mg EC tablet Take 1 Tablet by mouth two (2) times a day.  levothyroxine (SYNTHROID) 175 mcg tablet TAKE 1 TABLET BY MOUTH DAILY BEFORE BREAKFAST. TAKE 1 EXTRA TABLET ONE DAY PER WEEK (CHOOSE THE SAME DAY EACH WEEK AND TAKE 2 TABLETS THAT D  Indications: hypothyroidism    diclofenac EC (VOLTAREN) 75 mg EC tablet Take 1 Tablet by mouth two (2) times a day. (Patient not taking: Reported on 3/14/2022)    levothyroxine (SYNTHROID) 175 mcg tablet TAKE 1 TABLET BY MOUTH DAILY BEFORE BREAKFAST.  TAKE 1 EXTRA TABLET ONE DAY PER WEEK (CHOOSE THE SAME DAY EACH WEEK) (Patient not taking: Reported on 3/14/2022)    raNITIdine (ZANTAC 75) 75 mg tablet Take 75 mg by mouth as needed for Indigestion. (Patient not taking: Reported on 12/20/2021)    sildenafil (REVATIO) 20 mg tablet Take 1-5 Tabs by mouth daily as needed. Indications: Erectile Dysfunction (Patient not taking: Reported on 3/14/2022)     No current facility-administered medications for this visit.        Past Medical History:   Diagnosis Date    Abdominal pain 9/25/15    Mariaa Gum Advance directive discussed with patient 05/09/2016    Chronic pain     back and right knee    Contact dermatitis and other eczema, due to unspecified cause     ak- derm yearly    Depression     ED (erectile dysfunction)     6/14/16 [facility]    Ganglion cyst of wrist 2/2009    dr Constantine Do GERD (gastroesophageal reflux disease) 11/17/2014    [facility]-to have EGD &colonscopy 10/25/16 f/u note    Headache(784.0)     History of chicken pox     Hypercholesterolemia     Loose stools 10/25/2016    note from [facility]-stools studies to be done    OA (osteoarthritis) of knee     [facility] Ambriz rash 9/2015    on his back    Thyroid disease         Past Surgical History:   Procedure Laterality Date    CT HEART W/O CONT WITH CALCIUM  1/2008    score 5    CT HEART W/O CONT WITH CALCIUM  11/2010    score 13.91    ENDOSCOPY, COLON, DIAGNOSTIC  10/04    dr Rboby kay repeat 5-10    HX COLONOSCOPY  11/21/14    [facility] 10 yr repeat    HX ENDOSCOPY  11/21/14    path neg for Barretts,shows inflammation    HX HEENT      T and A    HX ORTHOPAEDIC  2009    gaglion cyst on right wrist    HX UROLOGICAL      vasectomy       Family History   Problem Relation Age of Onset    Heart Disease Father     Psychiatric Disorder Brother         Social History     Socioeconomic History    Marital status:      Spouse name: Not on file    Number of children: Not on file    Years of education: Not on file    Highest education level: Not on file   Occupational History    Not on file   Tobacco Use    Smoking status: Former Smoker    Smokeless tobacco: Never Used   Substance and Sexual Activity    Alcohol use: Yes     Comment: Beer and Wine weekly    Drug use: No    Sexual activity: Yes     Partners: Female     Birth control/protection: Surgical   Other Topics Concern    Not on file   Social History Narrative    Not on file     Social Determinants of Health     Financial Resource Strain:     Difficulty of Paying Living Expenses: Not on file   Food Insecurity:     Worried About Running Out of Food in the Last Year: Not on file    Renee of Food in the Last Year: Not on file   Transportation Needs:     Lack of Transportation (Medical): Not on file    Lack of Transportation (Non-Medical): Not on file   Physical Activity:     Days of Exercise per Week: Not on file    Minutes of Exercise per Session: Not on file   Stress:     Feeling of Stress : Not on file   Social Connections:     Frequency of Communication with Friends and Family: Not on file    Frequency of Social Gatherings with Friends and Family: Not on file    Attends Church Services: Not on file    Active Member of [address] [place] [address] or Organizations: Not on file    Attends Club or Organization Meetings: Not on file    Marital Status: Not on file   Intimate Partner Violence:     Fear of Current or Ex-Partner: Not on file    Emotionally Abused: Not on file    Physically Abused: Not on file    Sexually Abused: Not on file   Housing Stability:     Unable to Pay for Housing in the Last Year: Not on file    Number of [place] in the Last Year: Not on file    Unstable Housing in the Last Year: Not on file       No orders of the defined types were placed in this encounter. Brit Pickering M.D. was available for immediate consultation as the supervising physician. An electronic signature was used to authenticate this note.   -- Sydney Rodas PA-C

## 2022-03-19 PROBLEM — K21.9 GASTROESOPHAGEAL REFLUX DISEASE: Status: ACTIVE | Noted: 2017-05-17

## 2025-08-25 ENCOUNTER — TRANSCRIBE ORDERS (OUTPATIENT)
Facility: HOSPITAL | Age: 70
End: 2025-08-25

## 2025-08-25 DIAGNOSIS — Z14.8 CARRIER OF GENE MUTATION FOR HIGH RISK OF CANCER: ICD-10-CM

## 2025-08-25 DIAGNOSIS — R93.3 ABNORMAL FINDINGS ON DIAGNOSTIC IMAGING OF OTHER PARTS OF DIGESTIVE TRACT: ICD-10-CM

## 2025-08-25 DIAGNOSIS — R79.89 ABNORMAL LFTS: Primary | ICD-10-CM

## 2025-08-25 DIAGNOSIS — Z15.09 BIALLELIC MUTATION OF CDKN2A GENE: ICD-10-CM

## 2025-08-25 DIAGNOSIS — Z15.01 BIALLELIC MUTATION OF CDKN2A GENE: ICD-10-CM

## 2025-08-25 DIAGNOSIS — K86.2 CYST OF PANCREAS: ICD-10-CM
